# Patient Record
Sex: FEMALE | Race: WHITE | NOT HISPANIC OR LATINO | Employment: OTHER | URBAN - METROPOLITAN AREA
[De-identification: names, ages, dates, MRNs, and addresses within clinical notes are randomized per-mention and may not be internally consistent; named-entity substitution may affect disease eponyms.]

---

## 2025-01-03 ENCOUNTER — OFFICE VISIT (OUTPATIENT)
Dept: OTOLARYNGOLOGY | Facility: CLINIC | Age: 80
End: 2025-01-03
Payer: MEDICARE

## 2025-01-03 ENCOUNTER — CLINICAL SUPPORT (OUTPATIENT)
Dept: AUDIOLOGY | Facility: CLINIC | Age: 80
End: 2025-01-03
Payer: MEDICARE

## 2025-01-03 DIAGNOSIS — H90.3 BILATERAL SENSORINEURAL HEARING LOSS: ICD-10-CM

## 2025-01-03 DIAGNOSIS — H91.21 SUDDEN HEARING LOSS, RIGHT: Primary | ICD-10-CM

## 2025-01-03 DIAGNOSIS — H91.21 SUDDEN RIGHT HEARING LOSS: Primary | ICD-10-CM

## 2025-01-03 DIAGNOSIS — H90.3 ASYMMETRIC SNHL (SENSORINEURAL HEARING LOSS): ICD-10-CM

## 2025-01-03 PROCEDURE — 1036F TOBACCO NON-USER: CPT | Performed by: OTOLARYNGOLOGY

## 2025-01-03 PROCEDURE — 1160F RVW MEDS BY RX/DR IN RCRD: CPT | Performed by: OTOLARYNGOLOGY

## 2025-01-03 PROCEDURE — 99203 OFFICE O/P NEW LOW 30 MIN: CPT | Performed by: OTOLARYNGOLOGY

## 2025-01-03 PROCEDURE — 92567 TYMPANOMETRY: CPT | Performed by: AUDIOLOGIST

## 2025-01-03 PROCEDURE — 1159F MED LIST DOCD IN RCRD: CPT | Performed by: OTOLARYNGOLOGY

## 2025-01-03 PROCEDURE — 92557 COMPREHENSIVE HEARING TEST: CPT | Performed by: AUDIOLOGIST

## 2025-01-03 RX ORDER — PREDNISONE 20 MG/1
TABLET ORAL
Qty: 9 TABLET | Refills: 0 | Status: SHIPPED | OUTPATIENT
Start: 2025-01-03

## 2025-01-03 ASSESSMENT — ENCOUNTER SYMPTOMS
CARDIOVASCULAR NEGATIVE: 1
NEUROLOGICAL NEGATIVE: 1
RESPIRATORY NEGATIVE: 1
CONSTITUTIONAL NEGATIVE: 1

## 2025-01-03 NOTE — PROGRESS NOTES
Mrs. Ruby, age 79, was seen today for a hearing evaluation during her ENT visit with Dr. Sanchez.  She does have a known history of bilateral hearing loss with O[employer]on hearing aids that she has reportedly never worn.  She arrives today reporting concern for her right ear noting a sudden decline in her hearing with a popping and crackling sensation which has been ongoing for the past 3-4 weeks.    Results:  Otoscopy revealed clear ear canals and tympanic membranes were visualized bilaterally.  Tympanometry revealed normal, Type A tympanograms, indicating normal ear canal volume, peak pressure and compliance bilaterally.  Audiometric thresholds revealed a slight sloping to moderate sensorineural hearing loss in her left ear and a moderate sensorineural hearing loss in her right ear.  Word recognition scores were excellent at 100% in her left ear and good at 80% in her right ear.    Recommendations:  Follow-up with PCP, Dr. Temple, as medically directed.  Follow-up with ENT, Dr. Sanchez, as medically directed.  Retest hearing as directed in conjunction with otologic management.

## 2025-01-03 NOTE — PROGRESS NOTES
Subjective   Patient ID: Yadira Ruby is a 79 y.o. female who presents for SUDDEN HEARING LOSS.    HPI  Patient presents today with a history of sudden hearing loss in the right ear.  This is occurred over the last 3 to 4 weeks where she has noted significant decline in that right ear.  There has been some change in balance but not naif room spinning vertigo.  No worrisome tinnitus.  All remaining ENT inquiry is otherwise grossly clear.  No prior history of hydrops.      Review of Systems   Constitutional: Negative.    HENT: Negative.     Respiratory: Negative.     Cardiovascular: Negative.    Neurological: Negative.        Physical Exam    General appearance: No acute distress. Normal facies. Symmetric facial movement. No gross lesions of the face are noted.  The external ear structures appear normal. The ear canals patent and the tympanic membranes are intact without evidence of air-fluid levels, retraction, or congenital defects.  Anterior rhinoscopy notes essentially a midline nasal septum. Examination is noted for normal healthy mucosal membranes without any evidence of lesions, polyps, or exudate. The tongue is normally mobile. There are no lesions on the gingiva, buccal, or oral mucosa. There are no oral cavity masses.  The neck is negative for mass lymphadenopathy. The trachea and parotid are clear. The thyroid bed is grossly unremarkable. The salivary gland structures are grossly unremarkable.    Audiogram: Bilateral sensorineural hearing loss asymmetric right greater than left throughout most frequencies albeit a mild differential or discrepancy.  Word discrimination 80 right 100 on the left with tympanometry normal.    Assessment/Plan     Possible sudden hearing loss versus hydrops component involving the right ear.  Will start on a course of prednisone taper over a week and have her back in the office in several weeks to see how she is faring.  We encouraged her to watch dietary intake of salt chocolate  and caffeine as well as watch stress levels to the best of her ability.  Detailed discussion with the patient and her  in this regard with all questions answered.

## 2025-01-17 ENCOUNTER — APPOINTMENT (OUTPATIENT)
Dept: OTOLARYNGOLOGY | Facility: CLINIC | Age: 80
End: 2025-01-17
Payer: MEDICARE

## 2025-01-17 ENCOUNTER — CLINICAL SUPPORT (OUTPATIENT)
Dept: AUDIOLOGY | Facility: CLINIC | Age: 80
End: 2025-01-17
Payer: MEDICARE

## 2025-01-17 DIAGNOSIS — H90.3 BILATERAL SENSORINEURAL HEARING LOSS: ICD-10-CM

## 2025-01-17 DIAGNOSIS — H91.21 SUDDEN HEARING LOSS, RIGHT: Primary | ICD-10-CM

## 2025-01-17 PROCEDURE — 99213 OFFICE O/P EST LOW 20 MIN: CPT | Performed by: OTOLARYNGOLOGY

## 2025-01-17 PROCEDURE — 92567 TYMPANOMETRY: CPT | Performed by: AUDIOLOGIST

## 2025-01-17 PROCEDURE — 92557 COMPREHENSIVE HEARING TEST: CPT | Performed by: AUDIOLOGIST

## 2025-01-17 PROCEDURE — 1160F RVW MEDS BY RX/DR IN RCRD: CPT | Performed by: OTOLARYNGOLOGY

## 2025-01-17 PROCEDURE — 1159F MED LIST DOCD IN RCRD: CPT | Performed by: OTOLARYNGOLOGY

## 2025-01-17 NOTE — PROGRESS NOTES
Patient returns surveillance recheck history of sudden hearing loss.  In general she notices some slight improvement.  She is denying any other worrisome ENT symptoms or signs.  She does have a history of hearing aids for last 5 years but does not wear them.  All remaining head neck inquiry is clear.  There have been no significant changes in past medical or past surgical histories except as mentioned.    Exam:  No acute distress.  The external ear structures appear normal. The ear canals patent and the tympanic membranes are intact without evidence of air-fluid levels, retraction, or congenital defects.  Anterior rhinoscopy notes essentially a midline nasal septum. Examination is noted for normal healthy mucosal membranes without any evidence of lesions, polyps, or exudate. The tongue is normally mobile. There are no lesions on the gingiva, buccal, or oral mucosa. There are no oral cavity masses.  The neck is negative for mass lymphadenopathy. The trachea and parotid are clear. The thyroid bed is grossly unremarkable. The salivary gland structures are grossly unremarkable.    Comparative audiogram: Bilateral stable loss slightly asymmetric right greater than left with word discrimination 80 right 96 left    Assessment and plan:  Stable asymmetric hearing loss.  May have very well had a sudden component but at this juncture we see nothing worrisome.  Thorough discussion with her regarding the findings.  Will trial course of antihistamine as they have a new dog at the house which may be causing some issues.  Update me as needed.  All questions were answered in this regard accordingly.   .

## 2025-01-17 NOTE — PROGRESS NOTES
Mrs. Ruby, age 79, was seen today for a repeat hearing evaluation during her follow up with ENT, Dr. Sanchez. She reported possible improved hearing in her right ear but an ongoing fuzzy, muffled, under water sensation as well as hearing loss, right greater than left.    Results:  Otoscopy revealed clear ear canals and tympanic membranes were visualized bilaterally.  Tympanometry revealed normal, Type A tympanograms, indicating normal ear canal volume, peak pressure and compliance bilaterally.   Audiometric thresholds revealed a mild to moderate sensorineural hearing loss bilaterally.  Word recognition scores were excellent at 96% in her left ear and good at 80% in her right ear.  Hearing levels have remained stable as compared to her last evaluation 1/3/2025.    Recommendations:  Follow-up with PCP, Dr. Temple, as medically directed.  Follow-up with ENT, Dr. Sanchez, as medically directed.  Consideration for binaural amplification.  The patient does have binaural Oticon OPN 1 hearing aids which are an estimated 5 years old that she admits to having never worn but would like to see about her options for reprogramming them.  Hearing aids will be checked and she will be contacted to return if this can be done.  Retest hearing in conjunction with otologic management or annually to monitor.

## 2025-01-30 ENCOUNTER — CLINICAL SUPPORT (OUTPATIENT)
Dept: AUDIOLOGY | Facility: CLINIC | Age: 80
End: 2025-01-30

## 2025-01-30 DIAGNOSIS — H90.3 BILATERAL SENSORINEURAL HEARING LOSS: Primary | ICD-10-CM

## 2025-01-30 NOTE — PROGRESS NOTES
Mrs. Ruby returned today for fitting/reprogramming of her binaural Oticon OPN 1 miniRITE 312 devices.  She denied significant concern overall for her hearing but has been experiencing a muffled sensation from her right ear for the past 6-8 weeks.  She does admit to difficulty at times hearing in restaurants and also notes that her  often complains she does not hear him.  The patient's hearing aids were purchased at an outside facility an estimated 5 years ago.  She admits to never having worn them but arrives today interested in having them adjusted for her current hearing loss.     Procedure:  Hearing aid prescription settings were updated today based on most recent hearing evaluation completed 1/17/12025 and she reported good sound quality in office.  Manual push button was activated for volume adjustment and its control was reviewed in office.  Start up jingle and low battery alert were also demonstrated.  Battery size, life expectancy and replacement was reviewed.  Insertion/removal of hearing devices were reviewed.  Payment for hearing aid reprogramming was accepted today.  She reported satisfaction in office and may return should concern or further fine tuning be warranted.